# Patient Record
Sex: MALE | Race: ASIAN | NOT HISPANIC OR LATINO | Employment: FULL TIME | URBAN - METROPOLITAN AREA
[De-identification: names, ages, dates, MRNs, and addresses within clinical notes are randomized per-mention and may not be internally consistent; named-entity substitution may affect disease eponyms.]

---

## 2017-12-27 ENCOUNTER — ALLSCRIPTS OFFICE VISIT (OUTPATIENT)
Dept: OTHER | Facility: OTHER | Age: 27
End: 2017-12-27

## 2017-12-27 DIAGNOSIS — M25.532 PAIN IN LEFT WRIST: ICD-10-CM

## 2017-12-28 NOTE — PROGRESS NOTES
Assessment   1  Left wrist pain (719 43) (M25 532)   2  Never a smoker    Plan   Left wrist pain    · Ibuprofen 600 MG Oral Tablet; Take 1 po up to TID with meals prn pain   · * XR WRIST 3+ VIEW LEFT; Status:Active; Requested for:[id number];    · 2 - Gayatri Merchant MD, Nelson Diehl  (Orthopedic Surgery) Co-Management  *  Status: Hold For -    Scheduling  Requested for: [id number]  Care Summary provided  : Yes    Discussion/Summary      Ulysses Nava Is here with ongoing left wrist pain  I believe he has a tendinitis which continually gets inflamed  I will have him avoid aggravating activities, use ice packs as needed and I have given a prescription for ibuprofen 600 mg to take t i d  with meals  We will also check an x-ray because of the duration of symptoms  have given him a referral to Orthopedics  recommend health maintenance physical exam in the new year  Chief Complaint   for about a month pt has left wrist pain - no known injury - cant lift at the gym anymore      History of Present Illness   HPI: Left wrist pain for about 2 months injury  feels like the wrist locks up when he goes to lift weight rested it and skipped the gym for a while  He tried to lift again today and it was too painful  handed works at [employer] at work  He works on computer a lot      Review of Systems        Constitutional: no fever or chills, feels well, no tiredness, no recent weight loss or weight gain  Musculoskeletal: as noted in HPI  Active Problems   1  Abdominal pain (789 00) (R10 9)   2  Acute gastritis (535 00) (K29 00)   3  Acute upper respiratory infection (465 9) (J06 9)   4  Chronic UTI (urinary tract infection) (599 0) (N39 0)   5  Gastroenteritis (558 9) (K52 9)   6  Right knee pain (719 46) (M25 561)   7  Sore throat (462) (J02 9)   8  Viral illness (079 99) (B34 9)    Past Medical History   1  History of Open Wound Of The Right Hand (882 0)  Active Problems And Past Medical History Reviewed:     The active problems and past medical history were reviewed and updated today  Social History    · Being A Social Drinker   · Never a smoker    Allergies   1  No Known Drug Allergies    Vitals    Recorded: [id number] 05:27PM   Temperature 98 1 F   Heart Rate 64   Respiration 18   Systolic 653   Diastolic 84   Height 5 ft 6 in   Weight 193 lb 8 oz   BMI Calculated 31 23   BSA Calculated 1 97     Physical Exam        Constitutional      General appearance: No acute distress, well appearing and well nourished  Pulmonary      Respiratory effort: No increased work of breathing or signs of respiratory distress  Auscultation of lungs: Clear to auscultation, equal breath sounds bilaterally, no wheezes, no rales, no rhonci  Cardiovascular      Auscultation of heart: Normal rate and rhythm, normal S1 and S2, without murmurs  Musculoskeletal      Inspection/palpation of joints, bones, and muscles: Abnormal  -- (left wrist without swelling or ecchymosis  normal pulses and sensation  tender over ulnar aspect of dorsum  full ROM but very painful with extension)         Results/Data   PHQ-2 Adult Depression Screening [id number] 06:14PM User, [facility]      Test Name Result Flag Reference   PHQ-2 Adult Depression Score 0     Over the last two weeks, how often have you been bothered by any of the following problems?      Little interest or pleasure in doing things: Not at all - 0     Feeling down, depressed, or hopeless: Not at all - 0   PHQ-2 Adult Depression Screening Negative          Signatures    Electronically signed by : Justin Kelley MD; Dec 27 2017  7:23PM EST                       (Author)

## 2018-01-23 VITALS
DIASTOLIC BLOOD PRESSURE: 84 MMHG | RESPIRATION RATE: 18 BRPM | HEART RATE: 64 BPM | HEIGHT: 66 IN | TEMPERATURE: 98.1 F | WEIGHT: 193.5 LBS | BODY MASS INDEX: 31.1 KG/M2 | SYSTOLIC BLOOD PRESSURE: 122 MMHG

## 2019-08-18 PROBLEM — M25.532 LEFT WRIST PAIN: Status: ACTIVE | Noted: 2017-12-27

## 2021-01-03 ENCOUNTER — OFFICE VISIT (OUTPATIENT)
Dept: URGENT CARE | Age: 31
End: 2021-01-03
Payer: COMMERCIAL

## 2021-01-03 VITALS — TEMPERATURE: 97 F | OXYGEN SATURATION: 99 % | RESPIRATION RATE: 20 BRPM | HEART RATE: 88 BPM

## 2021-01-03 DIAGNOSIS — R05.9 COUGH: ICD-10-CM

## 2021-01-03 DIAGNOSIS — R51.9 ACUTE NONINTRACTABLE HEADACHE, UNSPECIFIED HEADACHE TYPE: ICD-10-CM

## 2021-01-03 DIAGNOSIS — Z20.822 EXPOSURE TO COVID-19 VIRUS: Primary | ICD-10-CM

## 2021-01-03 PROCEDURE — 99213 OFFICE O/P EST LOW 20 MIN: CPT | Performed by: PHYSICIAN ASSISTANT

## 2021-01-03 PROCEDURE — U0003 INFECTIOUS AGENT DETECTION BY NUCLEIC ACID (DNA OR RNA); SEVERE ACUTE RESPIRATORY SYNDROME CORONAVIRUS 2 (SARS-COV-2) (CORONAVIRUS DISEASE [COVID-19]), AMPLIFIED PROBE TECHNIQUE, MAKING USE OF HIGH THROUGHPUT TECHNOLOGIES AS DESCRIBED BY CMS-2020-01-R: HCPCS | Performed by: PHYSICIAN ASSISTANT

## 2021-01-03 RX ORDER — CHOLECALCIFEROL (VITAMIN D3) 125 MCG
1000 CAPSULE ORAL
COMMUNITY

## 2021-01-03 RX ORDER — MULTIVIT-MIN/IRON FUM/FOLIC AC 7.5 MG-4
1 TABLET ORAL
COMMUNITY

## 2021-01-03 RX ORDER — OMEPRAZOLE 20 MG/1
1 CAPSULE, DELAYED RELEASE ORAL DAILY
COMMUNITY
Start: 2016-11-29

## 2021-01-03 NOTE — PATIENT INSTRUCTIONS

## 2021-01-03 NOTE — PROGRESS NOTES
[facility]        NAME: Leah Adan is a 31 y o  male  : 1990    MRN: [id number]  DATE: January 3, 2021  TIME: 6:55 PM    Assessment and Plan   Exposure to COVID-19 virus [Z20 822]  1  Exposure to COVID-19 virus  Novel Coronavirus (COVID-19), PCR [facility] - Office Collection   2  Acute nonintractable headache, unspecified headache type     3  Cough           Patient Instructions       Follow up with PCP in 3-5 days  Proceed to  ER if symptoms worsen  Chief Complaint     Chief Complaint   Patient presents with    Headache     pt states started with headache and cough 2-3 days ago, had family members positive for covid    Cough         History of Present Illness       Patient for evaluation testing for COVID-19  Patient is little bit of a headache and cough denies any shortness of breath  Review of Systems   Review of Systems   Constitutional: Negative  HENT: Negative  Eyes: Negative  Respiratory: Positive for cough  Negative for shortness of breath and wheezing  Cardiovascular: Negative  Gastrointestinal: Negative  Neurological: Positive for headaches  Negative for dizziness, tremors, seizures, syncope, facial asymmetry, speech difficulty, weakness, light-headedness and numbness           Current Medications       Current Outpatient Medications:     omeprazole (PriLOSEC) 20 mg delayed release capsule, Take 1 capsule by mouth Daily, Disp: , Rfl:     Multiple Vitamins-Minerals (multivitamin with minerals) tablet, Take 1 tablet by mouth, Disp: , Rfl:     vitamin B-12 (VITAMIN B-12) 500 mcg tablet, Take 1,000 mcg by mouth, Disp: , Rfl:     Current Allergies     Allergies as of 2021    (No Known Allergies)            The following portions of the patient's history were reviewed and updated as appropriate: allergies, current medications, past family history, past medical history, past social history, past surgical history and problem list      Past Medical History:   Diagnosis Date    Open wound of right hand     last assessed: 3/12/2013       History reviewed  No pertinent surgical history  History reviewed  No pertinent family history  Medications have been verified  Objective   Pulse 88   Temp (!) 97 °F (36 1 °C)   Resp 20   SpO2 99%   No LMP for male patient  Physical Exam     Physical Exam  Vitals signs and nursing note reviewed  Constitutional:       General: He is not in acute distress  Appearance: Normal appearance  He is well-developed  He is not ill-appearing, toxic-appearing or diaphoretic  HENT:      Head: Normocephalic and atraumatic  Eyes:      General:         Right eye: No discharge  Left eye: No discharge  Extraocular Movements: Extraocular movements intact  Conjunctiva/sclera: Conjunctivae normal       Pupils: Pupils are equal, round, and reactive to light  Cardiovascular:      Rate and Rhythm: Normal rate and regular rhythm  Heart sounds: Normal heart sounds  No murmur  Pulmonary:      Effort: Pulmonary effort is normal  No respiratory distress  Breath sounds: Normal breath sounds  No stridor  No wheezing, rhonchi or rales  Skin:     General: Skin is warm and dry  Neurological:      General: No focal deficit present  Mental Status: He is alert and oriented to person, place, and time  Psychiatric:         Mood and Affect: Mood normal          Behavior: Behavior normal          Thought Content:  Thought content normal          Judgment: Judgment normal

## 2021-01-05 LAB — SARS-COV-2 RNA SPEC QL NAA+PROBE: NOT DETECTED

## 2022-08-27 ENCOUNTER — APPOINTMENT (OUTPATIENT)
Dept: LAB | Age: 32
End: 2022-08-27
Payer: COMMERCIAL

## 2022-08-27 DIAGNOSIS — R10.13 ABDOMINAL PAIN, EPIGASTRIC: ICD-10-CM

## 2022-08-27 LAB
ALBUMIN SERPL BCP-MCNC: 4.2 G/DL (ref 3.5–5)
ALP SERPL-CCNC: 100 U/L (ref 46–116)
ALT SERPL W P-5'-P-CCNC: 96 U/L (ref 12–78)
AMYLASE SERPL-CCNC: 61 IU/L (ref 25–115)
ANION GAP SERPL CALCULATED.3IONS-SCNC: 5 MMOL/L (ref 4–13)
AST SERPL W P-5'-P-CCNC: 36 U/L (ref 5–45)
BASOPHILS # BLD AUTO: 0.02 THOUSANDS/ΜL (ref 0–0.1)
BASOPHILS NFR BLD AUTO: 0 % (ref 0–1)
BILIRUB SERPL-MCNC: 0.63 MG/DL (ref 0.2–1)
BUN SERPL-MCNC: 14 MG/DL (ref 5–25)
CALCIUM SERPL-MCNC: 9 MG/DL (ref 8.3–10.1)
CHLORIDE SERPL-SCNC: 107 MMOL/L (ref 96–108)
CO2 SERPL-SCNC: 28 MMOL/L (ref 21–32)
CREAT SERPL-MCNC: 1.14 MG/DL (ref 0.6–1.3)
EOSINOPHIL # BLD AUTO: 0.25 THOUSAND/ΜL (ref 0–0.61)
EOSINOPHIL NFR BLD AUTO: 3 % (ref 0–6)
ERYTHROCYTE [DISTWIDTH] IN BLOOD BY AUTOMATED COUNT: 12.4 % (ref 11.6–15.1)
GFR SERPL CREATININE-BSD FRML MDRD: 84 ML/MIN/1.73SQ M
GLUCOSE P FAST SERPL-MCNC: 96 MG/DL (ref 65–99)
HCT VFR BLD AUTO: 49.2 % (ref 36.5–49.3)
HGB BLD-MCNC: 16.4 G/DL (ref 12–17)
IMM GRANULOCYTES # BLD AUTO: 0.02 THOUSAND/UL (ref 0–0.2)
IMM GRANULOCYTES NFR BLD AUTO: 0 % (ref 0–2)
LIPASE SERPL-CCNC: 132 U/L (ref 73–393)
LYMPHOCYTES # BLD AUTO: 2.46 THOUSANDS/ΜL (ref 0.6–4.47)
LYMPHOCYTES NFR BLD AUTO: 34 % (ref 14–44)
MCH RBC QN AUTO: 31.8 PG (ref 26.8–34.3)
MCHC RBC AUTO-ENTMCNC: 33.3 G/DL (ref 31.4–37.4)
MCV RBC AUTO: 95 FL (ref 82–98)
MONOCYTES # BLD AUTO: 0.51 THOUSAND/ΜL (ref 0.17–1.22)
MONOCYTES NFR BLD AUTO: 7 % (ref 4–12)
NEUTROPHILS # BLD AUTO: 4.01 THOUSANDS/ΜL (ref 1.85–7.62)
NEUTS SEG NFR BLD AUTO: 56 % (ref 43–75)
NRBC BLD AUTO-RTO: 0 /100 WBCS
PLATELET # BLD AUTO: 189 THOUSANDS/UL (ref 149–390)
PMV BLD AUTO: 11.2 FL (ref 8.9–12.7)
POTASSIUM SERPL-SCNC: 4 MMOL/L (ref 3.5–5.3)
PROT SERPL-MCNC: 8.4 G/DL (ref 6.4–8.4)
RBC # BLD AUTO: 5.16 MILLION/UL (ref 3.88–5.62)
SODIUM SERPL-SCNC: 140 MMOL/L (ref 135–147)
WBC # BLD AUTO: 7.27 THOUSAND/UL (ref 4.31–10.16)

## 2022-08-27 PROCEDURE — 85025 COMPLETE CBC W/AUTO DIFF WBC: CPT

## 2022-08-27 PROCEDURE — 82784 ASSAY IGA/IGD/IGG/IGM EACH: CPT

## 2022-08-27 PROCEDURE — 82150 ASSAY OF AMYLASE: CPT

## 2022-08-27 PROCEDURE — 86231 EMA EACH IG CLASS: CPT

## 2022-08-27 PROCEDURE — 86258 DGP ANTIBODY EACH IG CLASS: CPT

## 2022-08-27 PROCEDURE — 86364 TISS TRNSGLTMNASE EA IG CLAS: CPT

## 2022-08-27 PROCEDURE — 36415 COLL VENOUS BLD VENIPUNCTURE: CPT

## 2022-08-27 PROCEDURE — 80053 COMPREHEN METABOLIC PANEL: CPT

## 2022-08-27 PROCEDURE — 83690 ASSAY OF LIPASE: CPT

## 2022-08-30 LAB
ENDOMYSIUM IGA SER QL: POSITIVE
GLIADIN PEPTIDE IGA SER-ACNC: 29 UNITS (ref 0–19)
GLIADIN PEPTIDE IGG SER-ACNC: 17 UNITS (ref 0–19)
IGA SERPL-MCNC: 259 MG/DL (ref 90–386)
TTG IGA SER-ACNC: 51 U/ML (ref 0–3)
TTG IGG SER-ACNC: 9 U/ML (ref 0–5)